# Patient Record
Sex: MALE | Race: WHITE | URBAN - METROPOLITAN AREA
[De-identification: names, ages, dates, MRNs, and addresses within clinical notes are randomized per-mention and may not be internally consistent; named-entity substitution may affect disease eponyms.]

---

## 2018-01-10 ENCOUNTER — NEW PATIENT (OUTPATIENT)
Dept: URBAN - METROPOLITAN AREA CLINIC 51 | Facility: CLINIC | Age: 78
End: 2018-01-10
Payer: MEDICARE

## 2018-01-10 DIAGNOSIS — H25.813 COMBINED FORMS OF AGE-RELATED CATARACT, BILATERAL: ICD-10-CM

## 2018-01-10 DIAGNOSIS — H35.3131 NONEXUDATIVE MACULAR DEGENERATION, EARLY DRY STAGE, BILATERAL: ICD-10-CM

## 2018-01-10 PROCEDURE — 76514 ECHO EXAM OF EYE THICKNESS: CPT | Performed by: OPTOMETRIST

## 2018-01-10 PROCEDURE — 92250 FUNDUS PHOTOGRAPHY W/I&R: CPT | Performed by: OPTOMETRIST

## 2018-01-10 PROCEDURE — 92004 COMPRE OPH EXAM NEW PT 1/>: CPT | Performed by: OPTOMETRIST

## 2018-01-10 ASSESSMENT — INTRAOCULAR PRESSURE
OD: 22
OS: 22

## 2018-01-10 ASSESSMENT — VISUAL ACUITY
OD: 20/20
OS: 20/30

## 2018-01-10 ASSESSMENT — KERATOMETRY
OS: 43.89
OD: 43.02

## 2018-05-02 ENCOUNTER — FOLLOW UP ESTABLISHED (OUTPATIENT)
Dept: URBAN - METROPOLITAN AREA CLINIC 51 | Facility: CLINIC | Age: 78
End: 2018-05-02
Payer: MEDICARE

## 2018-05-02 DIAGNOSIS — H43.811 VITREOUS DEGENERATION, RIGHT EYE: ICD-10-CM

## 2018-05-02 PROCEDURE — 92083 EXTENDED VISUAL FIELD XM: CPT | Performed by: OPTOMETRIST

## 2018-05-02 PROCEDURE — 92014 COMPRE OPH EXAM EST PT 1/>: CPT | Performed by: OPTOMETRIST

## 2018-05-02 ASSESSMENT — INTRAOCULAR PRESSURE
OS: 20
OD: 23

## 2019-01-08 ENCOUNTER — FOLLOW UP ESTABLISHED (OUTPATIENT)
Dept: URBAN - METROPOLITAN AREA CLINIC 51 | Facility: CLINIC | Age: 79
End: 2019-01-08
Payer: MEDICARE

## 2019-01-08 PROCEDURE — 92250 FUNDUS PHOTOGRAPHY W/I&R: CPT | Performed by: OPTOMETRIST

## 2019-01-08 PROCEDURE — 92014 COMPRE OPH EXAM EST PT 1/>: CPT | Performed by: OPTOMETRIST

## 2019-01-08 ASSESSMENT — INTRAOCULAR PRESSURE
OS: 22
OD: 23

## 2019-01-08 ASSESSMENT — KERATOMETRY
OS: 43.84
OD: 43.03

## 2019-01-08 ASSESSMENT — VISUAL ACUITY
OS: 20/30
OD: 20/15

## 2019-05-06 ENCOUNTER — FOLLOW UP ESTABLISHED (OUTPATIENT)
Dept: URBAN - METROPOLITAN AREA CLINIC 51 | Facility: CLINIC | Age: 79
End: 2019-05-06
Payer: MEDICARE

## 2019-05-06 PROCEDURE — 92012 INTRM OPH EXAM EST PATIENT: CPT | Performed by: OPTOMETRIST

## 2019-05-06 PROCEDURE — 92083 EXTENDED VISUAL FIELD XM: CPT | Performed by: OPTOMETRIST

## 2019-05-06 ASSESSMENT — INTRAOCULAR PRESSURE
OD: 18
OS: 19

## 2019-12-05 ENCOUNTER — FOLLOW UP ESTABLISHED (OUTPATIENT)
Dept: URBAN - METROPOLITAN AREA CLINIC 51 | Facility: CLINIC | Age: 79
End: 2019-12-05
Payer: MEDICARE

## 2019-12-05 PROCEDURE — 92014 COMPRE OPH EXAM EST PT 1/>: CPT | Performed by: OPTOMETRIST

## 2019-12-05 PROCEDURE — 92083 EXTENDED VISUAL FIELD XM: CPT | Performed by: OPTOMETRIST

## 2019-12-05 PROCEDURE — 92134 CPTRZ OPH DX IMG PST SGM RTA: CPT | Performed by: OPTOMETRIST

## 2019-12-05 ASSESSMENT — INTRAOCULAR PRESSURE
OS: 19
OD: 18

## 2019-12-05 ASSESSMENT — VISUAL ACUITY
OD: 20/20
OS: 20/40

## 2019-12-05 ASSESSMENT — KERATOMETRY
OS: 43.78
OD: 43.19

## 2019-12-11 ENCOUNTER — Encounter (OUTPATIENT)
Dept: URBAN - METROPOLITAN AREA CLINIC 51 | Facility: CLINIC | Age: 79
End: 2019-12-11
Payer: MEDICARE

## 2019-12-11 DIAGNOSIS — Z01.818 ENCOUNTER FOR OTHER PREPROCEDURAL EXAMINATION: Primary | ICD-10-CM

## 2019-12-11 DIAGNOSIS — H25.12 AGE-RELATED NUCLEAR CATARACT, LEFT EYE: Primary | ICD-10-CM

## 2019-12-11 PROCEDURE — 92025 CPTRIZED CORNEAL TOPOGRAPHY: CPT | Performed by: OPHTHALMOLOGY

## 2019-12-11 PROCEDURE — 99213 OFFICE O/P EST LOW 20 MIN: CPT | Performed by: PHYSICIAN ASSISTANT

## 2019-12-16 ENCOUNTER — NEW PATIENT (OUTPATIENT)
Dept: URBAN - METROPOLITAN AREA CLINIC 44 | Facility: CLINIC | Age: 79
End: 2019-12-16
Payer: MEDICARE

## 2019-12-16 PROCEDURE — 92004 COMPRE OPH EXAM NEW PT 1/>: CPT | Performed by: OPHTHALMOLOGY

## 2019-12-16 ASSESSMENT — INTRAOCULAR PRESSURE
OD: 15
OS: 15

## 2019-12-30 ENCOUNTER — SURGERY (OUTPATIENT)
Dept: URBAN - METROPOLITAN AREA SURGERY 19 | Facility: SURGERY | Age: 79
End: 2019-12-30
Payer: MEDICARE

## 2019-12-30 PROCEDURE — 66984 XCAPSL CTRC RMVL W/O ECP: CPT | Performed by: OPHTHALMOLOGY

## 2019-12-30 RX ORDER — DICLOFENAC SODIUM 1 MG/ML
0.1 % SOLUTION/ DROPS OPHTHALMIC
Qty: 1 | Refills: 0 | Status: INACTIVE
Start: 2019-12-30 | End: 2020-03-04

## 2019-12-30 RX ORDER — PREDNISOLONE ACETATE 10 MG/ML
1 % SUSPENSION/ DROPS OPHTHALMIC
Qty: 1 | Refills: 1 | Status: INACTIVE
Start: 2019-12-30 | End: 2020-03-12

## 2019-12-30 RX ORDER — OFLOXACIN 3 MG/ML
0.3 % SOLUTION/ DROPS OPHTHALMIC
Qty: 1 | Refills: 1 | Status: INACTIVE
Start: 2019-12-30 | End: 2020-03-12

## 2019-12-31 ENCOUNTER — POST OP (OUTPATIENT)
Dept: URBAN - METROPOLITAN AREA CLINIC 51 | Facility: CLINIC | Age: 79
End: 2019-12-31

## 2019-12-31 PROCEDURE — 99024 POSTOP FOLLOW-UP VISIT: CPT | Performed by: OPTOMETRIST

## 2019-12-31 ASSESSMENT — INTRAOCULAR PRESSURE: OS: 12

## 2020-01-08 ENCOUNTER — POST OP (OUTPATIENT)
Dept: URBAN - METROPOLITAN AREA CLINIC 51 | Facility: CLINIC | Age: 80
End: 2020-01-08

## 2020-01-08 PROCEDURE — 99024 POSTOP FOLLOW-UP VISIT: CPT | Performed by: OPTOMETRIST

## 2020-01-08 ASSESSMENT — INTRAOCULAR PRESSURE
OD: 13
OS: 12

## 2020-01-08 ASSESSMENT — VISUAL ACUITY
OD: 20/20
OS: 20/20

## 2020-02-04 ENCOUNTER — POST OP (OUTPATIENT)
Dept: URBAN - METROPOLITAN AREA CLINIC 51 | Facility: CLINIC | Age: 80
End: 2020-02-04
Payer: MEDICARE

## 2020-02-04 DIAGNOSIS — Z96.1 PRESENCE OF INTRAOCULAR LENS: Primary | ICD-10-CM

## 2020-02-04 PROCEDURE — 99024 POSTOP FOLLOW-UP VISIT: CPT | Performed by: OPTOMETRIST

## 2020-02-04 ASSESSMENT — VISUAL ACUITY
OD: 20/20
OS: 20/20

## 2020-02-04 ASSESSMENT — INTRAOCULAR PRESSURE
OD: 13
OS: 12

## 2020-03-04 ENCOUNTER — Encounter (OUTPATIENT)
Dept: URBAN - METROPOLITAN AREA CLINIC 51 | Facility: CLINIC | Age: 80
End: 2020-03-04
Payer: MEDICARE

## 2020-03-04 PROCEDURE — 99213 OFFICE O/P EST LOW 20 MIN: CPT | Performed by: PHYSICIAN ASSISTANT

## 2020-03-04 RX ORDER — OFLOXACIN 3 MG/ML
0.3 % SOLUTION/ DROPS OPHTHALMIC
Qty: 1 | Refills: 1 | Status: INACTIVE
Start: 2020-03-04 | End: 2020-04-30

## 2020-03-04 RX ORDER — PREDNISOLONE ACETATE 10 MG/ML
1 % SUSPENSION/ DROPS OPHTHALMIC
Qty: 1 | Refills: 1 | Status: INACTIVE
Start: 2020-03-04 | End: 2020-04-30

## 2020-03-04 RX ORDER — DICLOFENAC SODIUM 1 MG/ML
0.1 % SOLUTION/ DROPS OPHTHALMIC
Qty: 1 | Refills: 0 | Status: INACTIVE
Start: 2020-03-04 | End: 2020-04-30

## 2020-03-11 ENCOUNTER — SURGERY (OUTPATIENT)
Dept: URBAN - METROPOLITAN AREA SURGERY 19 | Facility: SURGERY | Age: 80
End: 2020-03-11
Payer: MEDICARE

## 2020-03-11 PROCEDURE — 66984 XCAPSL CTRC RMVL W/O ECP: CPT | Performed by: OPHTHALMOLOGY

## 2020-03-12 ENCOUNTER — POST OP (OUTPATIENT)
Dept: URBAN - METROPOLITAN AREA CLINIC 51 | Facility: CLINIC | Age: 80
End: 2020-03-12

## 2020-03-12 PROCEDURE — 99024 POSTOP FOLLOW-UP VISIT: CPT | Performed by: OPTOMETRIST

## 2020-03-12 ASSESSMENT — INTRAOCULAR PRESSURE: OD: 13

## 2020-04-30 ENCOUNTER — POST OP (OUTPATIENT)
Dept: URBAN - METROPOLITAN AREA CLINIC 51 | Facility: CLINIC | Age: 80
End: 2020-04-30

## 2020-04-30 PROCEDURE — 99024 POSTOP FOLLOW-UP VISIT: CPT | Performed by: OPTOMETRIST

## 2020-04-30 ASSESSMENT — INTRAOCULAR PRESSURE
OD: 12
OS: 10

## 2020-04-30 ASSESSMENT — VISUAL ACUITY
OS: 20/20
OD: 20/20

## 2021-11-10 ENCOUNTER — OFFICE VISIT (OUTPATIENT)
Dept: URBAN - METROPOLITAN AREA CLINIC 51 | Facility: CLINIC | Age: 81
End: 2021-11-10
Payer: MEDICARE

## 2021-11-10 DIAGNOSIS — H04.123 DRY EYE SYNDROME OF BILATERAL LACRIMAL GLANDS: ICD-10-CM

## 2021-11-10 DIAGNOSIS — H43.813 VITREOUS DEGENERATION, BILATERAL: ICD-10-CM

## 2021-11-10 DIAGNOSIS — H40.013 OPEN ANGLE WITH BORDERLINE FINDINGS, LOW RISK, BILATERAL: ICD-10-CM

## 2021-11-10 DIAGNOSIS — H52.4 PRESBYOPIA: ICD-10-CM

## 2021-11-10 DIAGNOSIS — H35.372 PUCKERING OF MACULA, LEFT EYE: ICD-10-CM

## 2021-11-10 PROCEDURE — 92133 CPTRZD OPH DX IMG PST SGM ON: CPT | Performed by: OPTOMETRIST

## 2021-11-10 PROCEDURE — 92134 CPTRZ OPH DX IMG PST SGM RTA: CPT | Performed by: OPTOMETRIST

## 2021-11-10 PROCEDURE — 92014 COMPRE OPH EXAM EST PT 1/>: CPT | Performed by: OPTOMETRIST

## 2021-11-10 ASSESSMENT — VISUAL ACUITY
OS: 20/20
OD: 20/20

## 2021-11-10 ASSESSMENT — INTRAOCULAR PRESSURE
OD: 14
OS: 13

## 2021-11-10 ASSESSMENT — KERATOMETRY
OD: 43.16
OS: 43.81

## 2021-11-10 NOTE — IMPRESSION/PLAN
Impression: Tear film insufficiency of bilateral lacrimal glands Plan: Recommend patient to use ATs QID or more OU

## 2021-11-10 NOTE — IMPRESSION/PLAN
Impression: Open angle with borderline findings, low risk, bilateral
*Secondary to disc appearance *Mother, brother and his aunt have glaucoma *IOPs today 14mmHg OD and 13mmHg OS  w/o meds 
*thick pach OD, avg OS
*Disc photos (unreliable OU) *OCT RNFL (11/10/2021) OD: 99um ; no thinning OS: 101um ; no thinning *OCT RNFL (01/08/19) OD: 90um ; no thinning OS: 88um ; no thinning *OCT RNFL (1/10/18) wnl OU
*Disc photos (1/10/18) *HVF 24-2 (12/05/19) OD: general reduction of sensitivity OS: full *HVF 24-2 (05/06/19) OU: unreliable due to fixation loss (4/11) *HVF 24-2 (5/2/18) full, reliable OU Plan: Reviewed the results of my examination today with the patient. I recommended after a review of the clinical data and risk factors, it would be reasonable to monitor without treatment. Will continue to monitor without treatment. 
Check IOP, OCT RNFL and HVF annually at [facility] [place]

## 2021-11-10 NOTE — IMPRESSION/PLAN
Impression: Nonexudative macular degeneration, early dry stage, bilateral
*Patient takes PreserVision MVs. 
 Plan: Macular degeneration, dry type with stable vision. No fluid or heme noted. Will continue to monitor vision and the patient has been instructed to call with any vision changes. Amsler grid was given today. Advised patient it would be best to start AREDS. Patient is a non smoker. Advised patient to eat green leafy vegetables. OD > OS. Discussed with patient will limit the vision post cataract surgery

## 2022-12-02 ENCOUNTER — OFFICE VISIT (OUTPATIENT)
Dept: URBAN - METROPOLITAN AREA CLINIC 51 | Facility: CLINIC | Age: 82
End: 2022-12-02
Payer: MEDICARE

## 2022-12-02 DIAGNOSIS — H35.3131 NONEXUDATIVE AGE-RELATED MACULAR DEGENERATION, BILATERAL, EARLY DRY STAGE: ICD-10-CM

## 2022-12-02 DIAGNOSIS — H43.813 VITREOUS DEGENERATION, BILATERAL: ICD-10-CM

## 2022-12-02 DIAGNOSIS — H04.123 DRY EYE SYNDROME OF BILATERAL LACRIMAL GLANDS: ICD-10-CM

## 2022-12-02 DIAGNOSIS — H40.013 OPEN ANGLE WITH BORDERLINE FINDINGS, LOW RISK, BILATERAL: ICD-10-CM

## 2022-12-02 DIAGNOSIS — H52.4 PRESBYOPIA: ICD-10-CM

## 2022-12-02 DIAGNOSIS — H26.492 OTHER SECONDARY CATARACT, LEFT EYE: Primary | ICD-10-CM

## 2022-12-02 PROCEDURE — 92134 CPTRZ OPH DX IMG PST SGM RTA: CPT | Performed by: OPTOMETRIST

## 2022-12-02 PROCEDURE — 92133 CPTRZD OPH DX IMG PST SGM ON: CPT | Performed by: OPTOMETRIST

## 2022-12-02 PROCEDURE — 99214 OFFICE O/P EST MOD 30 MIN: CPT | Performed by: OPTOMETRIST

## 2022-12-02 ASSESSMENT — VISUAL ACUITY
OS: 20/30
OD: 20/20

## 2022-12-02 ASSESSMENT — INTRAOCULAR PRESSURE
OD: 16
OS: 15

## 2022-12-02 NOTE — IMPRESSION/PLAN
Impression: Nonexudative macular degeneration, early dry stage, bilateral
*Patient takes PreserVision MVs. Plan: Patient to continue taking ARED's formula multiple vitamin daily. Macular OCT today reviewed. Observation is all that is indicated at this time.  Macular OCT today reviewed

## 2022-12-02 NOTE — IMPRESSION/PLAN
Impression: Open angle with borderline findings, low risk, bilateral
*Secondary to disc appearance *Mother, brother and his aunt have glaucoma *IOPs today 16mmHg OD and 15mmHg OS untreated
*thick pach OD, avg OS
*Disc photos (unreliable OU) *OCT RNFL (12/02/2022) OD: 92um; no thinning OS: 89um; no thinning *OCT RNFL (11/10/2021) OD: 99um ; no thinning OS: 101um ; no thinning *OCT RNFL (01/08/19) OD: 90um ; no thinning OS: 88um ; no thinning *OCT RNFL (1/10/18) wnl OU
*Disc photos (1/10/18) *HVF 24-2 (12/05/19) OD: general reduction of sensitivity OS: full *HVF 24-2 (05/06/19) OU: unreliable due to fixation loss (4/11) *HVF 24-2 (5/2/18) full, reliable OU Plan: Reviewed the results of my examination today with the patient. I recommended after a review of the clinical data and risk factors, it would be reasonable to monitor without treatment. Will continue to monitor without treatment. 
[place] [facility] [place]

## 2023-12-19 ENCOUNTER — OFFICE VISIT (OUTPATIENT)
Dept: URBAN - METROPOLITAN AREA CLINIC 51 | Facility: CLINIC | Age: 83
End: 2023-12-19
Payer: MEDICARE

## 2023-12-19 DIAGNOSIS — H43.813 VITREOUS DEGENERATION, BILATERAL: ICD-10-CM

## 2023-12-19 DIAGNOSIS — H52.4 PRESBYOPIA: ICD-10-CM

## 2023-12-19 DIAGNOSIS — H40.013 OPEN ANGLE WITH BORDERLINE FINDINGS, LOW RISK, BILATERAL: ICD-10-CM

## 2023-12-19 DIAGNOSIS — Z96.1 PRESENCE OF INTRAOCULAR LENS: ICD-10-CM

## 2023-12-19 DIAGNOSIS — H35.3131 NONEXUDATIVE AGE-RELATED MACULAR DEGENERATION, BILATERAL, EARLY DRY STAGE: Primary | ICD-10-CM

## 2023-12-19 DIAGNOSIS — H04.123 DRY EYE SYNDROME OF BILATERAL LACRIMAL GLANDS: ICD-10-CM

## 2023-12-19 PROCEDURE — 92133 CPTRZD OPH DX IMG PST SGM ON: CPT | Performed by: OPTOMETRIST

## 2023-12-19 PROCEDURE — 92014 COMPRE OPH EXAM EST PT 1/>: CPT | Performed by: OPTOMETRIST

## 2023-12-19 PROCEDURE — 92134 CPTRZ OPH DX IMG PST SGM RTA: CPT | Performed by: OPTOMETRIST

## 2023-12-19 ASSESSMENT — VISUAL ACUITY
OS: 20/20
OD: 20/20

## 2023-12-19 ASSESSMENT — INTRAOCULAR PRESSURE
OD: 12
OS: 13

## 2023-12-19 ASSESSMENT — KERATOMETRY
OD: 43.25
OS: 43.50

## 2025-02-04 ENCOUNTER — OFFICE VISIT (OUTPATIENT)
Dept: URBAN - METROPOLITAN AREA CLINIC 51 | Facility: CLINIC | Age: 85
End: 2025-02-04
Payer: MEDICARE

## 2025-02-04 DIAGNOSIS — H40.013 OPEN ANGLE WITH BORDERLINE FINDINGS, LOW RISK, BILATERAL: ICD-10-CM

## 2025-02-04 DIAGNOSIS — H04.123 TEAR FILM INSUFFICIENCY OF BILATERAL LACRIMAL GLANDS: ICD-10-CM

## 2025-02-04 DIAGNOSIS — H35.3131 NEXDTVE AGE-RELATED MCLR DEGN, BILATERAL, EARLY DRY STAGE: Primary | ICD-10-CM

## 2025-02-04 DIAGNOSIS — H52.4 PRESBYOPIA: ICD-10-CM

## 2025-02-04 DIAGNOSIS — H43.813 VITREOUS DEGENERATION, BILATERAL: ICD-10-CM

## 2025-02-04 DIAGNOSIS — Z96.1 PRESENCE OF INTRAOCULAR LENS: ICD-10-CM

## 2025-02-04 PROCEDURE — 92014 COMPRE OPH EXAM EST PT 1/>: CPT | Performed by: OPTOMETRIST

## 2025-02-04 PROCEDURE — 92133 CPTRZD OPH DX IMG PST SGM ON: CPT | Performed by: OPTOMETRIST

## 2025-02-04 PROCEDURE — 92134 CPTRZ OPH DX IMG PST SGM RTA: CPT | Performed by: OPTOMETRIST

## 2025-02-04 ASSESSMENT — INTRAOCULAR PRESSURE
OS: 15
OD: 13

## 2025-02-04 ASSESSMENT — VISUAL ACUITY
OS: 20/20
OD: 20/20